# Patient Record
Sex: MALE | URBAN - METROPOLITAN AREA
[De-identification: names, ages, dates, MRNs, and addresses within clinical notes are randomized per-mention and may not be internally consistent; named-entity substitution may affect disease eponyms.]

---

## 2024-03-08 ENCOUNTER — PROCEDURE VISIT (OUTPATIENT)
Dept: PHYSICAL MEDICINE AND REHAB | Age: 64
End: 2024-03-08

## 2024-03-08 VITALS
HEIGHT: 70 IN | WEIGHT: 219 LBS | HEART RATE: 79 BPM | SYSTOLIC BLOOD PRESSURE: 160 MMHG | BODY MASS INDEX: 31.35 KG/M2 | DIASTOLIC BLOOD PRESSURE: 80 MMHG

## 2024-03-08 DIAGNOSIS — Z02.71 DISABILITY EXAMINATION: Primary | ICD-10-CM

## 2024-03-08 RX ORDER — PANTOPRAZOLE SODIUM 40 MG/1
TABLET, DELAYED RELEASE ORAL
COMMUNITY
Start: 2022-08-06

## 2024-03-08 RX ORDER — FENOFIBRATE 48 MG/1
48 TABLET, COATED ORAL DAILY
COMMUNITY

## 2024-03-08 RX ORDER — CARVEDILOL 25 MG/1
25 TABLET ORAL DAILY
COMMUNITY

## 2024-03-08 RX ORDER — TRAZODONE HYDROCHLORIDE 50 MG/1
TABLET ORAL
COMMUNITY
Start: 2022-08-09

## 2024-03-08 RX ORDER — METFORMIN HYDROCHLORIDE 750 MG/1
750 TABLET, EXTENDED RELEASE ORAL 2 TIMES DAILY
COMMUNITY

## 2024-03-08 RX ORDER — HYDROCODONE BITARTRATE AND ACETAMINOPHEN 5; 325 MG/1; MG/1
1 TABLET ORAL 2 TIMES DAILY PRN
COMMUNITY
Start: 2024-02-29

## 2024-03-08 RX ORDER — LISINOPRIL AND HYDROCHLOROTHIAZIDE 25; 20 MG/1; MG/1
1 TABLET ORAL DAILY
COMMUNITY
Start: 2024-02-20

## 2024-03-08 RX ORDER — GABAPENTIN 600 MG/1
TABLET ORAL
COMMUNITY
Start: 2024-02-14

## 2024-03-08 RX ORDER — TAMSULOSIN HYDROCHLORIDE 0.4 MG/1
CAPSULE ORAL
COMMUNITY
Start: 2022-08-14

## 2024-03-08 NOTE — PROGRESS NOTES
Watches tv.    Home life, social and relational support includes: Lives with wife who helps with ADL's.      PMH, PSH reviewed and are not related to allowed conditions in claim.     Current Outpatient Medications   Medication Sig Dispense Refill    carvedilol (COREG) 25 MG tablet Take 1 tablet by mouth daily      fenofibrate (TRICOR) 48 MG tablet Take 1 tablet by mouth daily      gabapentin (NEURONTIN) 600 MG tablet PLEASE SEE ATTACHED FOR DETAILED DIRECTIONS      HYDROcodone-acetaminophen (NORCO) 5-325 MG per tablet Take 1 tablet by mouth 2 times daily as needed.      lisinopril-hydroCHLOROthiazide (PRINZIDE;ZESTORETIC) 20-25 MG per tablet Take 1 tablet by mouth daily      metFORMIN (GLUCOPHAGE-XR) 750 MG extended release tablet Take 1 tablet by mouth 2 times daily      pantoprazole (PROTONIX) 40 MG tablet       tamsulosin (FLOMAX) 0.4 MG capsule       traZODone (DESYREL) 50 MG tablet        No current facility-administered medications for this visit.     No Known Allergies    Social History     Tobacco Use    Smoking status: Former     Current packs/day: 0.00     Types: Cigarettes     Quit date:      Years since quittin.2    Smokeless tobacco: Never   Substance Use Topics    Alcohol use: Not Currently    Drug use: Never     The claimant does perform regular exercise home exercises from PT.     Review of Records provided: I reviewed all of the records provided to me by the [employer].    FROI-1: 2021 Annel HAWK  IC2: 2023, Isiah Taylor M.D., 2023, Teddy Bryant M.D.  C-9:  2021,2022, 2022, Rico Hudson M.D.  C 143: 03/10/2023, Breana Whitt D.O.  Office Notes: 2021,2023 Marysol LOPEZ CNP, 2021,10/25/2021,2022, 2022, Rico Hudson Jr., M.D., 2023, Jason Quintero D.O., 2023,2023 Isabella Mishra [facility], 2023, Lorraine LOPEZ CNP, 10/19/2023, Tank Horne M.D., 2023, Isiah Nance